# Patient Record
Sex: MALE | Race: WHITE | URBAN - NONMETROPOLITAN AREA
[De-identification: names, ages, dates, MRNs, and addresses within clinical notes are randomized per-mention and may not be internally consistent; named-entity substitution may affect disease eponyms.]

---

## 2017-01-27 ENCOUNTER — TELEPHONE (OUTPATIENT)
Dept: FAMILY MEDICINE CLINIC | Facility: CLINIC | Age: 27
End: 2017-01-27

## 2017-01-27 RX ORDER — GLIMEPIRIDE 2 MG/1
2 TABLET ORAL
Qty: 30 TABLET | Refills: 0 | Status: SHIPPED | OUTPATIENT
Start: 2017-01-27 | End: 2017-03-27

## 2017-01-27 NOTE — TELEPHONE ENCOUNTER
Left message for pt to call office.  Needs appointment scheduled with labs    Last OV: 10/4/2016  Last labs: 10/4/2016  Last filled; both filled 12/2/2016 #30 no RF

## 2017-03-27 RX ORDER — GLIMEPIRIDE 2 MG/1
2 TABLET ORAL
Qty: 30 TABLET | Refills: 0 | Status: SHIPPED | OUTPATIENT
Start: 2017-03-27 | End: 2017-05-06

## 2017-03-27 RX ORDER — CITALOPRAM 10 MG/1
10 TABLET ORAL
Qty: 90 TABLET | Refills: 0 | Status: SHIPPED | OUTPATIENT
Start: 2017-03-27 | End: 2017-05-06

## 2017-03-27 NOTE — TELEPHONE ENCOUNTER
Last office visit 10-4-16  Was to follow up and repeat labs in 1 month. Last refill Citalopram 11-14-16  Last refill Metformin & Glimepiride 1-27-17 x 30 days with note last refill until patient had labs and office visit.   Future Appointments  Date Time P

## 2017-03-28 ENCOUNTER — CHARTING TRANS (OUTPATIENT)
Dept: URGENT CARE | Age: 27
End: 2017-03-28

## 2017-03-28 ASSESSMENT — PAIN SCALES - GENERAL: PAINLEVEL_OUTOF10: 0

## 2017-04-08 ENCOUNTER — NURSE ONLY (OUTPATIENT)
Dept: FAMILY MEDICINE CLINIC | Facility: CLINIC | Age: 27
End: 2017-04-08

## 2017-04-08 DIAGNOSIS — E11.8 TYPE 2 DIABETES MELLITUS WITH COMPLICATION, UNSPECIFIED LONG TERM INSULIN USE STATUS: ICD-10-CM

## 2017-04-08 DIAGNOSIS — E78.00 HYPERCHOLESTEROLEMIA: Primary | ICD-10-CM

## 2017-04-08 PROCEDURE — 80053 COMPREHEN METABOLIC PANEL: CPT | Performed by: FAMILY MEDICINE

## 2017-04-08 PROCEDURE — 80061 LIPID PANEL: CPT | Performed by: FAMILY MEDICINE

## 2017-04-08 PROCEDURE — 83036 HEMOGLOBIN GLYCOSYLATED A1C: CPT | Performed by: FAMILY MEDICINE

## 2017-04-08 PROCEDURE — 36415 COLL VENOUS BLD VENIPUNCTURE: CPT | Performed by: FAMILY MEDICINE

## 2017-05-02 ENCOUNTER — MED REC SCAN ONLY (OUTPATIENT)
Dept: FAMILY MEDICINE CLINIC | Facility: CLINIC | Age: 27
End: 2017-05-02

## 2017-05-06 ENCOUNTER — OFFICE VISIT (OUTPATIENT)
Dept: FAMILY MEDICINE CLINIC | Facility: CLINIC | Age: 27
End: 2017-05-06

## 2017-05-06 VITALS
SYSTOLIC BLOOD PRESSURE: 138 MMHG | WEIGHT: 280 LBS | DIASTOLIC BLOOD PRESSURE: 88 MMHG | TEMPERATURE: 98 F | BODY MASS INDEX: 36 KG/M2

## 2017-05-06 DIAGNOSIS — E66.01 SEVERE OBESITY (BMI 35.0-35.9 WITH COMORBIDITY) (HCC): ICD-10-CM

## 2017-05-06 DIAGNOSIS — R79.89 ELEVATED LFTS: ICD-10-CM

## 2017-05-06 DIAGNOSIS — E11.65 POORLY CONTROLLED TYPE 2 DIABETES MELLITUS (HCC): ICD-10-CM

## 2017-05-06 DIAGNOSIS — E78.00 HYPERCHOLESTEROLEMIA: Primary | ICD-10-CM

## 2017-05-06 DIAGNOSIS — F41.9 ANXIETY: ICD-10-CM

## 2017-05-06 PROCEDURE — 99214 OFFICE O/P EST MOD 30 MIN: CPT | Performed by: FAMILY MEDICINE

## 2017-05-06 RX ORDER — GLIMEPIRIDE 2 MG/1
2 TABLET ORAL
Qty: 30 TABLET | Refills: 0 | Status: SHIPPED | OUTPATIENT
Start: 2017-05-06 | End: 2017-05-06

## 2017-05-06 RX ORDER — CITALOPRAM 10 MG/1
10 TABLET ORAL
Qty: 90 TABLET | Refills: 0 | Status: SHIPPED | OUTPATIENT
Start: 2017-05-06 | End: 2017-08-17

## 2017-05-06 NOTE — ASSESSMENT & PLAN NOTE
Discussed the risk of this and the importance of losing weight to decrease the inflammation of the liver.     Discussed weight loss through caloric reduction and aerobic exercise

## 2017-05-06 NOTE — ASSESSMENT & PLAN NOTE
No change in medication at this time. As the patient has increased weight, decreased exercise, and has elevated LFTs presumably from fatty liver, he needs to have lifestyle change specifically with weight loss.   On the next visit if his numbers have not c

## 2017-05-06 NOTE — PROGRESS NOTES
Breana Mitchell is a 32year old male. Patient presents with: Follow - Up: Discuss lab reults   Rm #6      HPI:   Here for follow-up of treatment of diabetes. Patient also recently had blood test and is here to discuss the findings.     Patient had Yesenia Gallegos 0.0 oz/week       0 Cans of beer per week       Comment: VERY RARLEY       BP Readings from Last 6 Encounters:  05/06/17 : 138/88  10/04/16 : 136/76  10/09/15 : 128/62  02/07/15 : 162/72  07/25/14 : 144/90  03/29/13 : 120/66      Wt Readings from Current Assessment & Plan      Stable    [x] chronic stable condition, noted historically, continues present status           Poorly controlled type 2 diabetes mellitus (HCC)    Overview     Blood Sugar Medications          MetFORMIN HCl 500 MG Oral Tab tablet 0      Sig: Take 1 tablet (500 mg total) by mouth daily with breakfast.      glimepiride 2 MG Oral Tab 30 tablet 0      Sig: Take 1 tablet (2 mg total) by mouth every morning before breakfast.                     Glen Perla M.D., [facility] [place]

## 2017-05-06 NOTE — PATIENT INSTRUCTIONS
Liver Panel  Does this test have other names? Liver function test (LFT); hepatic function test; liver function panel (LFP);  Alb, Tbil, Dbil, Alk Phos, ALT, Tot Protein  What is this test?  This group of tests measures specific proteins and enzymes in yo You also might have this test if you've been exposed to a hepatitis virus, have a family history of liver disease, drink excessive amounts of alcohol, or take medicines that can cause liver damage.   You may also need this test to help your healthcare prov Diseases unrelated to the liver can cause abnormal test results. Some people with advanced liver disease may have normal test results. How is this test done? The test requires a blood sample, which is drawn through a needle from a vein in your arm.   Does ? 2-4 times per year, your doctor will check a hemoglobin A1c, a measure of your average blood sugar over the last 3 months. In almost all patients, this should be <7%.   If it is not, you and your doctor need to make a change to achieve this goal through 4. Neuropathy: High blood sugar levels over time can affect the nerves in the feet, legs, and hands causing numbness, tingling, or pain called diabetic neuropathy.   Loss of sensation can lead to undetected infection which may lead to widespread serious inf

## 2017-05-08 RX ORDER — GLIMEPIRIDE 2 MG/1
TABLET ORAL
Qty: 90 TABLET | Refills: 0 | Status: SHIPPED | OUTPATIENT
Start: 2017-05-08 | End: 2017-08-17

## 2017-06-30 ENCOUNTER — OFFICE VISIT (OUTPATIENT)
Dept: FAMILY MEDICINE CLINIC | Facility: CLINIC | Age: 27
End: 2017-06-30

## 2017-06-30 ENCOUNTER — APPOINTMENT (OUTPATIENT)
Dept: LAB | Age: 27
End: 2017-06-30
Attending: FAMILY MEDICINE
Payer: COMMERCIAL

## 2017-06-30 VITALS
DIASTOLIC BLOOD PRESSURE: 80 MMHG | HEIGHT: 74 IN | TEMPERATURE: 98 F | WEIGHT: 285 LBS | BODY MASS INDEX: 36.57 KG/M2 | HEART RATE: 92 BPM | SYSTOLIC BLOOD PRESSURE: 138 MMHG | RESPIRATION RATE: 18 BRPM

## 2017-06-30 DIAGNOSIS — Z23 NEED FOR TDAP VACCINATION: ICD-10-CM

## 2017-06-30 DIAGNOSIS — E11.65 POORLY CONTROLLED TYPE 2 DIABETES MELLITUS (HCC): ICD-10-CM

## 2017-06-30 DIAGNOSIS — Z79.899 ENCOUNTER FOR LONG-TERM (CURRENT) USE OF MEDICATIONS: Primary | ICD-10-CM

## 2017-06-30 DIAGNOSIS — F41.9 ANXIETY: ICD-10-CM

## 2017-06-30 DIAGNOSIS — E78.00 HYPERCHOLESTEROLEMIA: ICD-10-CM

## 2017-06-30 LAB
EST. AVERAGE GLUCOSE BLD GHB EST-MCNC: 160 MG/DL (ref 68–126)
HBA1C MFR BLD HPLC: 7.2 % (ref ?–5.7)

## 2017-06-30 PROCEDURE — 83036 HEMOGLOBIN GLYCOSYLATED A1C: CPT | Performed by: FAMILY MEDICINE

## 2017-06-30 PROCEDURE — 90471 IMMUNIZATION ADMIN: CPT | Performed by: FAMILY MEDICINE

## 2017-06-30 PROCEDURE — 99214 OFFICE O/P EST MOD 30 MIN: CPT | Performed by: FAMILY MEDICINE

## 2017-06-30 PROCEDURE — 36415 COLL VENOUS BLD VENIPUNCTURE: CPT | Performed by: FAMILY MEDICINE

## 2017-06-30 PROCEDURE — 90715 TDAP VACCINE 7 YRS/> IM: CPT | Performed by: FAMILY MEDICINE

## 2017-06-30 NOTE — PROGRESS NOTES
Demetrio Butler is a 32year old male. Patient presents with:   Follow - Up: DM, room 2      HPI:   gema for follow  Needs tdap    Denies issues  Doesn't ck sugar often  Denies pain    DIABETIC FLOWSHEET ([facility]) Latest Ref Rng & Units 6/30/2017 5/6/2017 5/6/2 into both eyes nightly. Disp: 5 mL Rfl: 2   TIMOLOL MALEATE OP Apply  to eye 2 (two) times daily. Disp:  Rfl:      No current facility-administered medications on file prior to visit.       Past Medical History:   Diagnosis Date   • ADHD (attention deficit CKD:  >90                                      STAGE 1    >60-90                                 STAGE 2    >30-60                                 STAGE 3    >30                                      STAGE 4           Lab Results  Component Value Date   A1C Need for Tdap vaccination        Relevant Orders    TETANUS, DIPHTHERIA TOXOIDS AND ACELLULAR PERTUSIS VACCINE (TDAP), >7 YEARS, IM USE (Completed)          Return in about 6 months (around 12/30/2017) for medication review, blood pressure check, discussio

## 2017-07-03 DIAGNOSIS — E11.65 POORLY CONTROLLED TYPE 2 DIABETES MELLITUS (HCC): Primary | ICD-10-CM

## 2017-08-17 RX ORDER — GLIMEPIRIDE 2 MG/1
TABLET ORAL
Qty: 90 TABLET | Refills: 0 | Status: SHIPPED | OUTPATIENT
Start: 2017-08-17 | End: 2017-11-20

## 2017-08-18 RX ORDER — CITALOPRAM 10 MG/1
10 TABLET ORAL
Qty: 90 TABLET | Refills: 0 | Status: SHIPPED | OUTPATIENT
Start: 2017-08-18 | End: 2017-11-20

## 2017-11-20 ENCOUNTER — TELEPHONE (OUTPATIENT)
Dept: FAMILY MEDICINE CLINIC | Facility: CLINIC | Age: 27
End: 2017-11-20

## 2017-11-20 RX ORDER — GLIMEPIRIDE 2 MG/1
TABLET ORAL
Qty: 90 TABLET | Refills: 0 | Status: SHIPPED | OUTPATIENT
Start: 2017-11-20 | End: 2018-02-16

## 2017-11-20 RX ORDER — CITALOPRAM 10 MG/1
10 TABLET ORAL
Qty: 90 TABLET | Refills: 0 | Status: SHIPPED | OUTPATIENT
Start: 2017-11-20 | End: 2018-02-16

## 2017-11-20 NOTE — TELEPHONE ENCOUNTER
REFILL METFORMIN, GLIMEPIRIDE & CITALOPRAM TO [facility], PT ALSO WONDERING IF THERE IS A [facility]/ DR CLOSER TO [place] THAT HE CAN BE SEEN AT SINCE THAT IS WHERE HE LIVES NOW?

## 2017-11-20 NOTE — TELEPHONE ENCOUNTER
Last OV: 6/30/2017  Last labs 6/30/2017  Citalopram: 8/18/2017 #90 no RF  Metformin: 8/17/2017 #90 no RF  Glimepiride: 8/17/2017 #90 no RF  Pt advised of Dr. Gemini Valero and number provided

## 2018-02-16 NOTE — TELEPHONE ENCOUNTER
Last office visit: 6/30/2017  Last A1C taken 6/30/2017: 7.2    Last refill for Glimepiride: 11/20/2017  Last refill for Metformin: 11/20/2017  Last refill for Citalopram Hydrobromide: 11/20/2017    Pending Prescriptions Disp Refills    GLIMEPIRIDE 2 MG Ora

## 2018-02-19 RX ORDER — GLIMEPIRIDE 2 MG/1
TABLET ORAL
Qty: 90 TABLET | Refills: 0 | Status: SHIPPED | OUTPATIENT
Start: 2018-02-19 | End: 2018-06-15

## 2018-02-19 RX ORDER — CITALOPRAM 10 MG/1
TABLET ORAL
Qty: 90 TABLET | Refills: 0 | Status: SHIPPED | OUTPATIENT
Start: 2018-02-19 | End: 2018-06-15

## 2018-02-28 ENCOUNTER — CHARTING TRANS (OUTPATIENT)
Dept: OTHER | Age: 28
End: 2018-02-28

## 2018-02-28 ENCOUNTER — LAB SERVICES (OUTPATIENT)
Dept: OTHER | Age: 28
End: 2018-02-28

## 2018-03-01 LAB — GLUCOSE BLD MANUAL STRIP-MCNC: 284 MG/DL (ref 66–97)

## 2018-03-07 ENCOUNTER — PATIENT OUTREACH (OUTPATIENT)
Dept: FAMILY MEDICINE CLINIC | Facility: CLINIC | Age: 28
End: 2018-03-07

## 2018-03-07 NOTE — PROGRESS NOTES
Patient due for office visit, labs, and microalbumin. Left message for patient to call back regarding this. MyChart letter also sent.

## 2018-06-16 DIAGNOSIS — F41.9 ANXIETY: ICD-10-CM

## 2018-06-16 DIAGNOSIS — E78.00 HYPERCHOLESTEROLEMIA: ICD-10-CM

## 2018-06-16 DIAGNOSIS — Z13.220 LIPID SCREENING: ICD-10-CM

## 2018-06-16 DIAGNOSIS — E11.65 POORLY CONTROLLED TYPE 2 DIABETES MELLITUS (HCC): ICD-10-CM

## 2018-06-16 DIAGNOSIS — Z91.030 ALLERGY TO BEE STING: ICD-10-CM

## 2018-06-16 DIAGNOSIS — R79.89 ELEVATED LFTS: Primary | ICD-10-CM

## 2018-06-16 RX ORDER — CITALOPRAM HYDROBROMIDE 10 MG/1
TABLET ORAL
Qty: 30 TABLET | Refills: 0 | Status: SHIPPED | OUTPATIENT
Start: 2018-06-16 | End: 2018-06-16

## 2018-06-16 RX ORDER — GLIMEPIRIDE 2 MG/1
TABLET ORAL
Qty: 30 TABLET | Refills: 0 | Status: SHIPPED | OUTPATIENT
Start: 2018-06-16 | End: 2018-06-16

## 2018-06-19 ENCOUNTER — APPOINTMENT (OUTPATIENT)
Dept: LAB | Age: 28
End: 2018-06-19
Attending: FAMILY MEDICINE
Payer: COMMERCIAL

## 2018-06-19 ENCOUNTER — OFFICE VISIT (OUTPATIENT)
Dept: FAMILY MEDICINE CLINIC | Facility: CLINIC | Age: 28
End: 2018-06-19

## 2018-06-19 VITALS
WEIGHT: 282.5 LBS | TEMPERATURE: 98 F | BODY MASS INDEX: 36 KG/M2 | DIASTOLIC BLOOD PRESSURE: 78 MMHG | SYSTOLIC BLOOD PRESSURE: 136 MMHG

## 2018-06-19 DIAGNOSIS — E11.65 POORLY CONTROLLED TYPE 2 DIABETES MELLITUS (HCC): ICD-10-CM

## 2018-06-19 DIAGNOSIS — Z91.030 ALLERGY TO BEE STING: ICD-10-CM

## 2018-06-19 DIAGNOSIS — E78.00 HYPERCHOLESTEROLEMIA: ICD-10-CM

## 2018-06-19 DIAGNOSIS — Z79.899 ENCOUNTER FOR LONG-TERM (CURRENT) USE OF MEDICATIONS: Primary | ICD-10-CM

## 2018-06-19 DIAGNOSIS — E11.9 CONTROLLED TYPE 2 DIABETES MELLITUS WITHOUT COMPLICATION, WITHOUT LONG-TERM CURRENT USE OF INSULIN (HCC): ICD-10-CM

## 2018-06-19 PROCEDURE — 99214 OFFICE O/P EST MOD 30 MIN: CPT | Performed by: FAMILY MEDICINE

## 2018-06-19 RX ORDER — CITALOPRAM 10 MG/1
TABLET ORAL
Qty: 90 TABLET | Refills: 1 | Status: SHIPPED | OUTPATIENT
Start: 2018-06-19 | End: 2018-11-29

## 2018-06-19 RX ORDER — EPINEPHRINE 0.3 MG/.3ML
INJECTION SUBCUTANEOUS
Qty: 3 EACH | Refills: 1 | Status: SHIPPED | OUTPATIENT
Start: 2018-06-19

## 2018-06-19 RX ORDER — GLIMEPIRIDE 2 MG/1
TABLET ORAL
Qty: 90 TABLET | Refills: 1 | Status: SHIPPED | OUTPATIENT
Start: 2018-06-19 | End: 2018-11-29

## 2018-06-19 NOTE — PROGRESS NOTES
Anuel Soldrebeca is a 32year old male. No chief complaint on file.     Subjective   HPI:   Patient presents for recheck of his  Diabetes  Does not check sugars  Eye exam 3 m ago  Sees for glaucoma also       Needs refill for epi pen       Anuel Jones i lb  05/06/17 : 280 lb  11/08/16 : 270 lb 3.2 oz  10/04/16 : 266 lb 12.8 oz  10/09/15 : 280 lb      REVIEW OF SYSTEMS:   GENERAL HEALTH: feels well no complaints  SKIN: denies any unusual skin lesions or rashes  RESPIRATORY: denies shortness of breath with

## 2018-06-21 DIAGNOSIS — E11.65 POORLY CONTROLLED TYPE 2 DIABETES MELLITUS (HCC): ICD-10-CM

## 2018-06-21 RX ORDER — FENOFIBRATE 145 MG/1
145 TABLET, COATED ORAL DAILY
Qty: 90 TABLET | Refills: 0 | COMMUNITY
Start: 2018-06-21 | End: 2018-07-09

## 2018-06-27 ENCOUNTER — MED REC SCAN ONLY (OUTPATIENT)
Dept: FAMILY MEDICINE CLINIC | Facility: CLINIC | Age: 28
End: 2018-06-27

## 2018-07-09 ENCOUNTER — TELEPHONE (OUTPATIENT)
Dept: FAMILY MEDICINE CLINIC | Facility: CLINIC | Age: 28
End: 2018-07-09

## 2018-07-09 RX ORDER — FENOFIBRATE 145 MG/1
145 TABLET, COATED ORAL DAILY
Qty: 90 TABLET | Refills: 0 | Status: SHIPPED | OUTPATIENT
Start: 2018-07-09 | End: 2018-11-29

## 2018-08-22 ENCOUNTER — TELEPHONE (OUTPATIENT)
Dept: FAMILY MEDICINE CLINIC | Facility: CLINIC | Age: 28
End: 2018-08-22

## 2018-08-25 ENCOUNTER — CHARTING TRANS (OUTPATIENT)
Dept: OTHER | Age: 28
End: 2018-08-25

## 2018-08-25 ASSESSMENT — PAIN SCALES - GENERAL: PAINLEVEL_OUTOF10: 4

## 2018-11-28 VITALS
OXYGEN SATURATION: 97 % | SYSTOLIC BLOOD PRESSURE: 128 MMHG | RESPIRATION RATE: 16 BRPM | TEMPERATURE: 97.7 F | HEART RATE: 87 BPM | DIASTOLIC BLOOD PRESSURE: 86 MMHG

## 2018-11-29 ENCOUNTER — TELEPHONE (OUTPATIENT)
Dept: FAMILY MEDICINE CLINIC | Facility: CLINIC | Age: 28
End: 2018-11-29

## 2018-11-29 DIAGNOSIS — F41.9 ANXIETY: ICD-10-CM

## 2018-11-29 DIAGNOSIS — E11.65 POORLY CONTROLLED TYPE 2 DIABETES MELLITUS (HCC): ICD-10-CM

## 2018-11-29 RX ORDER — GLIMEPIRIDE 2 MG/1
TABLET ORAL
Qty: 90 TABLET | Refills: 0 | Status: SHIPPED | OUTPATIENT
Start: 2018-11-29 | End: 2019-03-18

## 2018-11-29 RX ORDER — FENOFIBRATE 145 MG/1
145 TABLET, COATED ORAL DAILY
Qty: 90 TABLET | Refills: 0 | Status: SHIPPED | OUTPATIENT
Start: 2018-11-29 | End: 2019-03-18

## 2018-11-29 RX ORDER — CITALOPRAM 10 MG/1
10 TABLET ORAL
Qty: 90 TABLET | Refills: 0 | Status: SHIPPED | OUTPATIENT
Start: 2018-11-29 | End: 2019-05-11

## 2018-11-29 NOTE — TELEPHONE ENCOUNTER
REFILL GLIMEPIRIDE, CITALOPRAM & Fenofibrate TO [facility] ON [address] [facility] Sheila Villalobos

## 2018-11-29 NOTE — TELEPHONE ENCOUNTER
Last OV: 6/19/2018  Last labs: 6/19/2018  Glimepiride: 6/19/2018 #90 w/ 1RF  Fenofibrate: 7/9/2018 #90 no RF  Citalopram: 6/19/2018 #90 w/ 1RF

## 2019-01-23 ENCOUNTER — PATIENT MESSAGE (OUTPATIENT)
Dept: TELEHEALTH | Age: 29
End: 2019-01-23

## 2019-02-26 ENCOUNTER — TELEPHONE (OUTPATIENT)
Dept: FAMILY MEDICINE CLINIC | Facility: CLINIC | Age: 29
End: 2019-02-26

## 2019-02-26 NOTE — TELEPHONE ENCOUNTER
[facility] CAN NOT ACCEPT MESSAGES AT THIS TIME .      I WILL SEND PT A LETTER INFORMING HIM HIS DUE FOR DILATED EYE EXAM

## 2019-03-04 ENCOUNTER — PATIENT OUTREACH (OUTPATIENT)
Dept: FAMILY MEDICINE CLINIC | Facility: CLINIC | Age: 29
End: 2019-03-04

## 2019-03-05 VITALS
TEMPERATURE: 98.3 F | SYSTOLIC BLOOD PRESSURE: 146 MMHG | DIASTOLIC BLOOD PRESSURE: 72 MMHG | OXYGEN SATURATION: 98 % | RESPIRATION RATE: 20 BRPM | HEART RATE: 96 BPM

## 2019-03-06 VITALS
SYSTOLIC BLOOD PRESSURE: 132 MMHG | DIASTOLIC BLOOD PRESSURE: 84 MMHG | HEART RATE: 107 BPM | TEMPERATURE: 100.4 F | OXYGEN SATURATION: 97 % | RESPIRATION RATE: 20 BRPM

## 2019-03-18 DIAGNOSIS — E11.65 POORLY CONTROLLED TYPE 2 DIABETES MELLITUS (HCC): ICD-10-CM

## 2019-03-18 RX ORDER — FENOFIBRATE 145 MG/1
TABLET, COATED ORAL
Qty: 90 TABLET | Refills: 0 | Status: SHIPPED | OUTPATIENT
Start: 2019-03-18 | End: 2019-06-17

## 2019-03-18 RX ORDER — GLIMEPIRIDE 2 MG/1
TABLET ORAL
Qty: 90 TABLET | Refills: 0 | Status: SHIPPED | OUTPATIENT
Start: 2019-03-18 | End: 2019-06-17

## 2019-05-11 DIAGNOSIS — F41.9 ANXIETY: ICD-10-CM

## 2019-05-13 RX ORDER — CITALOPRAM 10 MG/1
TABLET ORAL
Qty: 90 TABLET | Refills: 0 | Status: SHIPPED | OUTPATIENT
Start: 2019-05-13 | End: 2019-08-17

## 2019-06-17 DIAGNOSIS — E11.65 POORLY CONTROLLED TYPE 2 DIABETES MELLITUS (HCC): ICD-10-CM

## 2019-06-17 RX ORDER — GLIMEPIRIDE 2 MG/1
TABLET ORAL
Qty: 30 TABLET | Refills: 0 | Status: SHIPPED | OUTPATIENT
Start: 2019-06-17 | End: 2019-07-20

## 2019-06-17 RX ORDER — GLIMEPIRIDE 2 MG/1
TABLET ORAL
Qty: 90 TABLET | Refills: 0 | OUTPATIENT
Start: 2019-06-17

## 2019-06-17 RX ORDER — FENOFIBRATE 145 MG/1
TABLET, COATED ORAL
Qty: 90 TABLET | Refills: 0 | OUTPATIENT
Start: 2019-06-17

## 2019-06-17 RX ORDER — FENOFIBRATE 145 MG/1
TABLET, COATED ORAL
Qty: 30 TABLET | Refills: 0 | Status: SHIPPED | OUTPATIENT
Start: 2019-06-17 | End: 2019-07-20

## 2019-06-20 ENCOUNTER — TELEPHONE (OUTPATIENT)
Dept: FAMILY MEDICINE CLINIC | Facility: CLINIC | Age: 29
End: 2019-06-20

## 2019-07-20 DIAGNOSIS — E11.65 POORLY CONTROLLED TYPE 2 DIABETES MELLITUS (HCC): ICD-10-CM

## 2019-07-22 RX ORDER — GLIMEPIRIDE 2 MG/1
TABLET ORAL
Qty: 30 TABLET | Refills: 0 | Status: SHIPPED | OUTPATIENT
Start: 2019-07-22 | End: 2019-08-19

## 2019-07-22 RX ORDER — FENOFIBRATE 145 MG/1
TABLET, COATED ORAL
Qty: 30 TABLET | Refills: 0 | Status: SHIPPED | OUTPATIENT
Start: 2019-07-22 | End: 2019-08-19

## 2019-07-22 NOTE — TELEPHONE ENCOUNTER
Last OV: 6/19/2018  Glimepiride: 6/17/2019 #30 no RF  Fenofibrate: 6/17/2019 #30 no RF    No future appointments.

## 2019-08-13 ENCOUNTER — TELEPHONE (OUTPATIENT)
Dept: FAMILY MEDICINE CLINIC | Facility: CLINIC | Age: 29
End: 2019-08-13

## 2019-08-13 NOTE — TELEPHONE ENCOUNTER
MED REC REQ RECEIVED 8/13/2019 FROM [facility]. SENT TO SCAN STAT 8/13/2019. EMR RECORDS ATTACHED. DOS REQ: ANY & ALL RECORDS.

## 2019-08-17 DIAGNOSIS — F41.9 ANXIETY: ICD-10-CM

## 2019-08-19 DIAGNOSIS — E11.65 POORLY CONTROLLED TYPE 2 DIABETES MELLITUS (HCC): ICD-10-CM

## 2019-08-19 RX ORDER — CITALOPRAM 10 MG/1
TABLET ORAL
Qty: 30 TABLET | Refills: 0 | Status: SHIPPED | OUTPATIENT
Start: 2019-08-19

## 2019-08-19 RX ORDER — FENOFIBRATE 145 MG/1
TABLET, COATED ORAL
Qty: 30 TABLET | Refills: 0 | Status: SHIPPED | OUTPATIENT
Start: 2019-08-19 | End: 2019-10-01

## 2019-08-19 RX ORDER — GLIMEPIRIDE 2 MG/1
TABLET ORAL
Qty: 30 TABLET | Refills: 0 | Status: SHIPPED | OUTPATIENT
Start: 2019-08-19 | End: 2019-10-01

## 2019-09-27 ENCOUNTER — TELEPHONE (OUTPATIENT)
Dept: FAMILY MEDICINE CLINIC | Facility: CLINIC | Age: 29
End: 2019-09-27

## 2019-09-27 NOTE — TELEPHONE ENCOUNTER
Last OV 6/19/18   Last ordered Metformin  9/16/19 #30  Fenofibrate last ordered 8/19/19 #30  No future appointments  I spoke with the patient, he has a new doctor, will call the pharmacy and advise.

## 2019-10-01 DIAGNOSIS — E11.65 POORLY CONTROLLED TYPE 2 DIABETES MELLITUS (HCC): ICD-10-CM

## 2019-10-02 RX ORDER — FENOFIBRATE 145 MG/1
TABLET, COATED ORAL
Qty: 30 TABLET | Refills: 0 | Status: SHIPPED | OUTPATIENT
Start: 2019-10-02 | End: 2019-12-13

## 2019-10-02 RX ORDER — GLIMEPIRIDE 2 MG/1
TABLET ORAL
Qty: 30 TABLET | Refills: 0 | Status: SHIPPED | OUTPATIENT
Start: 2019-10-02

## 2019-10-02 NOTE — TELEPHONE ENCOUNTER
Last refill on both meds #30 on 8/19/19  Last labs were done in June 2018  Reminder letter sent to patient that he needs labs and office visit. Last office visit on 6/19/18  No future appointments.

## 2019-10-15 NOTE — TELEPHONE ENCOUNTER
Last refill #30 on 9/16/19  Last office visit and labs not since 6/19/18  No future appointments. Reminder letter sent.

## 2019-11-02 DIAGNOSIS — E11.65 POORLY CONTROLLED TYPE 2 DIABETES MELLITUS (HCC): ICD-10-CM

## 2019-11-04 RX ORDER — GLIMEPIRIDE 2 MG/1
TABLET ORAL
Qty: 30 TABLET | Refills: 0 | OUTPATIENT
Start: 2019-11-04

## 2019-12-13 RX ORDER — FENOFIBRATE 145 MG/1
145 TABLET, COATED ORAL
Qty: 30 TABLET | Refills: 0 | Status: SHIPPED | OUTPATIENT
Start: 2019-12-13

## 2020-01-10 RX ORDER — FENOFIBRATE 145 MG/1
145 TABLET, COATED ORAL
Qty: 30 TABLET | Refills: 0 | OUTPATIENT
Start: 2020-01-10

## 2020-01-10 NOTE — TELEPHONE ENCOUNTER
*L/M FOR PT TO MAKE F/U APPT*    LOV: 6/19/18    LAST LAB: 6/19/18    LAST RX: 12/13/19, 30 tabs, 0 refills    Next OV: No future appointments.       PROTOCOL  Cholesterol Medication Protocol Failed1/10 5:01 PM   ALT < 80    ALT resulted within past year

## 2020-02-19 ENCOUNTER — MED REC SCAN ONLY (OUTPATIENT)
Dept: FAMILY MEDICINE CLINIC | Facility: CLINIC | Age: 30
End: 2020-02-19

## 2020-11-13 ENCOUNTER — TELEPHONE (OUTPATIENT)
Dept: FAMILY MEDICINE CLINIC | Facility: CLINIC | Age: 30
End: 2020-11-13

## 2020-11-13 ENCOUNTER — MED REC SCAN ONLY (OUTPATIENT)
Dept: FAMILY MEDICINE CLINIC | Facility: CLINIC | Age: 30
End: 2020-11-13

## 2020-11-13 RX ORDER — OMEGA-3-ACID ETHYL ESTERS 1 G/1
2 CAPSULE, LIQUID FILLED ORAL 2 TIMES DAILY
COMMUNITY
Start: 2020-10-05

## 2020-11-13 RX ORDER — LATANOPROST 50 UG/ML
SOLUTION/ DROPS OPHTHALMIC
COMMUNITY
Start: 2020-09-23

## 2020-11-13 RX ORDER — TIMOLOL MALEATE 5 MG/ML
SOLUTION/ DROPS OPHTHALMIC
COMMUNITY
Start: 2020-11-08

## 2021-04-09 DIAGNOSIS — Z23 NEED FOR VACCINATION: ICD-10-CM

## (undated) NOTE — LETTER
[facility], [facility], [address] [place] [id number]  [phone]            3/7/2018    [facility]  [address] [place] [id number]    Dear Jr Caro records indicate that you are due for a

## (undated) NOTE — LETTER
[address] [place] [address]  [address] [address]        Dear Hanna Almanzar,    We have received a request from your pharmac

## (undated) NOTE — LETTER
07/11/19              Toña Figures   [address] [facility] [id number]          Dear Lary Ortiz,           [facility] records indicate that you are due for labs .   Please call our office during normal business hours so that we may schedule this appointment

## (undated) NOTE — MR AVS SNAPSHOT
[facility]  [address] [facility] [address]  [phone]               Thank you for choosing us for your health care visit with Scarlet Roa MD.  We are glad to serve you and happy to provide you with this summary o · Albumin. Albumin is a protein made in the liver. · Alkaline phosphatase (ALP). ALP is an enzyme found in high quantities in your liver, bile duct, and elsewhere in your body. · Alanine transaminase (ALT). ALT is another enzyme found in your liver.   · A blood to clot. A prothrombin time test measures how long it takes your blood to clot. What do my test results mean? Many things may affect your lab test results.  These include the method each lab uses to do the test. Even if your test results are differe You don't need to prepare for this test. But the test may be more accurate if you fast for 10 to 12 hours beforehand. Be sure your healthcare provider knows about all medicines, herbs, vitamins, and supplements you are taking.  This includes medicines that ? Have your blood pressure checked at each visit. It should be <130/80. ?  Have your cholesterol checked at least once per year and more frequently if not at goal.  Your LDL should be <100 (or <70 if you have known heart disease), your HDL should be >40 f risk of severe complications. ?         Allergies as of May 06, 2017     Bee Venom                 Today's Vital Signs     BP Temp Weight             138/88 mmHg 97.9 °F (36.6 °C) (Temporal) 280 lb            Current Medications          This list is accur Summaries. If you've been to the Emergency Department or your doctor's office, you can view your past visit information in [facility] by going to Visits < Visit Summaries. [facility] questions? Call [phone] for help.   [facility] is NOT to be used for urge